# Patient Record
Sex: MALE | Race: WHITE | URBAN - METROPOLITAN AREA
[De-identification: names, ages, dates, MRNs, and addresses within clinical notes are randomized per-mention and may not be internally consistent; named-entity substitution may affect disease eponyms.]

---

## 2024-08-08 ENCOUNTER — OFFICE VISIT (OUTPATIENT)
Dept: FAMILY MEDICINE CLINIC | Facility: CLINIC | Age: 29
End: 2024-08-08
Payer: COMMERCIAL

## 2024-08-08 VITALS
WEIGHT: 224 LBS | DIASTOLIC BLOOD PRESSURE: 84 MMHG | HEIGHT: 72 IN | RESPIRATION RATE: 16 BRPM | OXYGEN SATURATION: 98 % | TEMPERATURE: 99 F | HEART RATE: 78 BPM | BODY MASS INDEX: 30.34 KG/M2 | SYSTOLIC BLOOD PRESSURE: 138 MMHG

## 2024-08-08 DIAGNOSIS — L23.7 ALLERGIC CONTACT DERMATITIS DUE TO PLANTS, EXCEPT FOOD: Primary | ICD-10-CM

## 2024-08-08 PROCEDURE — 99203 OFFICE O/P NEW LOW 30 MIN: CPT | Performed by: NURSE PRACTITIONER

## 2024-08-08 PROCEDURE — 3079F DIAST BP 80-89 MM HG: CPT | Performed by: NURSE PRACTITIONER

## 2024-08-08 PROCEDURE — 3008F BODY MASS INDEX DOCD: CPT | Performed by: NURSE PRACTITIONER

## 2024-08-08 PROCEDURE — 3075F SYST BP GE 130 - 139MM HG: CPT | Performed by: NURSE PRACTITIONER

## 2024-08-08 RX ORDER — TRIAMCINOLONE ACETONIDE 5 MG/G
CREAM TOPICAL
Qty: 45 G | Refills: 0 | Status: SHIPPED | OUTPATIENT
Start: 2024-08-08

## 2024-08-08 NOTE — PROGRESS NOTES
CHIEF COMPLAINT:     Chief Complaint   Patient presents with    Rash     Sx 12 days - Pt was doing yard work and noticed itchy red bumps on R arm and a few on L arm  Denies fever, chills, body aches  OTC Cortisone-10, triple antibiotic cream, calamine lotino        HPI:    John Zhu is a 28 year old male who presents for evaluation of a rash. Rash is present to both forearms.  Initial onset 12 days ago, shortly after doing a lot of yard work.  Rash has been very itchy, not really tender, more of a mild discomfort.  No honey crusting or drainage.  Denies fever, chills, dyspnea, aches, N/V, or malaise.  Has tried cortisone, benadryl, triple antibiotic cream, calamine without much relief.  Denies Hx of MRSA.    Current Outpatient Medications   Medication Sig Dispense Refill    triamcinolone 0.5 % External Cream Apply to affected areas of arms BID, do not use longer than 2 weeks 45 g 0    mupirocin 2 % External Ointment Apply to crusted areas of arms BID-TID for 7 days 22 g 0      History reviewed. No pertinent past medical history.   History reviewed. No pertinent surgical history.   History reviewed. No pertinent family history.   Social History     Socioeconomic History    Marital status: Unknown   Tobacco Use    Smoking status: Never     Passive exposure: Never    Smokeless tobacco: Never         REVIEW OF SYSTEMS:   GENERAL: feels well otherwise, no fever, no chills.  SKIN: Per HPI. No edema. No ulcerations.  EYES: Denies blurred vision or double vision  HEENT: Denies rhinorrhea, edema of the lips or swelling of throat.  CARDIOVASCULAR: Denies chest pains or palpitations.  LUNGS: Denies shortness of breath with exertion or rest. No cough or wheezing.  LYMPH: Denies enlargement of the lymph nodes.  MUSC/SKEL: Denies joint swelling or joint stiffness.  GI: Denies abdominal pain, N/V/C/D.  NEURO: Denies abnormal sensation, tingling of the skin, or numbness.      EXAM:   /84   Pulse 78   Temp 98.6 °F (37 °C)    Resp 16   Ht 6' (1.829 m)   Wt 224 lb (101.6 kg)   SpO2 98%   BMI 30.38 kg/m²   GENERAL: Well-appearing, well developed, well nourished, and in no apparent distress  SKIN: +Blistery rash on erythematous base, scabbed in some areas.  No honey crusting or drainage.  Rash is non-tender to palpation, no streaking.  EYES: PERRLA, EOMI, conjunctiva are clear  HENT: Head atraumatic, normocephalic. TM's WNL bilaterally. Normal external nose. Nasal mucosa pink without edema. No erythema of the throat. Oropharynx moist without lesions.  NECK:  Supple. Non tender.  LUNGS: Clear to auscultation bilaterally.  No wheezing, rhonchi, or rales.  No diminished breath sounds. No increased work of breathing.   CARDIO: RRR without murmur.  LYMPH: No lymphadenopathy.   EXTREMITIES: No edema.              ASSESSMENT AND PLAN:   John Zhu is a 28 year old male who presents for evaluation of a rash. Findings are consistent with:    ASSESSMENT:  Encounter Diagnosis   Name Primary?    Allergic contact dermatitis due to plants, except food Yes       PLAN:   - Meds as listed below.  Will have pt apply the mupirocin on the scabbed areas as it has already been there 12 days.  No drainage, non-tender.  - Can continue comfort measures as described in Patient Instructions.    - Avoid scratching, monitor for signs of secondary infection which were discussed.  - Skin care discussed with patient.    - Advised F/U visit with PCP or IC if no improvement/worsening within 5-7 days.  - Pt verbalizes understanding and is agreeable with plan.    Meds & Refills for this Visit:  Requested Prescriptions     Signed Prescriptions Disp Refills    triamcinolone 0.5 % External Cream 45 g 0     Sig: Apply to affected areas of arms BID, do not use longer than 2 weeks    mupirocin 2 % External Ointment 22 g 0     Sig: Apply to crusted areas of arms BID-TID for 7 days       There are no Patient Instructions on file for this visit.     Risk and benefits of  medication discussed.  Pt verbalizes understanding.